# Patient Record
(demographics unavailable — no encounter records)

---

## 2024-11-07 NOTE — ASSESSMENT
[FreeTextEntry1] : Assessment: Ingrown toenail, medial and lateral aspect, left and right hallux and medial aspect, bilateral second toe.   Plan: I performed straightback procedures utilizing a 12.7 cm sterile box lock, double spring fine cut back D tip fine tip stainless steel nail splitter removing the medial and lateral borders of both the left and right hallux nails as far back as tolerable and applied Amerigel wound gel with sterile compression dressings. The patient was instructed to start soaking the feet in lukewarm water and Epsom salts, 2 tablespoons per pint for 20 minutes twice per day.  The patient was advised to dry the feet with a clean towel and then apply a sterile dressing to the area for the next 5 days.  I advised the patient to notify the office right away if increased redness, swelling, pain, open wounds or discharge were observed.  PTR:  4-6 weeks or as needed.

## 2024-11-07 NOTE — PHYSICAL EXAM
[TextEntry] : Dorsalis pedis and posterior tibial pulses were palpable and equal bilat.  The capillary return was instant bilat.  The medial and lateral borders of both hallux nails are incurvated and there is local erythema present.  Swelling is present in the nail folds.  There is a similar appearance to the medial aspects of the second toes on both feet.

## 2024-11-07 NOTE — HISTORY OF PRESENT ILLNESS
[FreeTextEntry1] : Miranda Velasco is an 11-year-old female patient who presents today with her mother for initial visit.  The mother relates that about two weeks ago they both went for pedicures and the  was rough on both of them.  Mother states that since then Miranda has been complaining about her toes.  They went to a pediatrician who sent her here.  Miranda states that first and second toes have been really uncomfortable and the mother noticed them getting red, so they did keep the appointment here.

## 2024-12-12 NOTE — ASSESSMENT
[FreeTextEntry1] : Assessment: Ingrown toenail, left hallux, right second toe.  Plan:   I performed straightback procedures utilizing a 12.7 cm sterile box lock, double spring fine cut back D tip fine tip stainless steel nail splitter removing the medial and lateral borders of both the left and right hallux nails as far back as tolerable and applied Amerigel wound gel with sterile compression dressings. The patient was instructed to start soaking the feet in lukewarm water and Epsom salts, 2 tablespoons per pint for 20 minutes twice per day.  The patient was advised to dry the feet with a clean towel and then apply a sterile dressing to the area for the next 5 days.  I advised the patient to notify the office right away if increased redness, swelling, pain, open wounds or discharge were observed.  PTR:  4-6 weeks or as needed.

## 2024-12-12 NOTE — HISTORY OF PRESENT ILLNESS
[FreeTextEntry1] : She returns with her father who had questions about why she keeps getting ingrown nails.  Miranda states that they were feeling great after she was here last time, but then over the last couple of days, they have started to dig in again.

## 2024-12-12 NOTE — PHYSICAL EXAM
[TextEntry] : The lateral aspect of the left hallux nail and the medial aspect of the right second toenail are erythematous with pain upon palpation and swelling present.  No pus or acute discharge is noted.

## 2025-01-02 NOTE — PHYSICAL EXAM
[TextEntry] : The lateral aspect of the left hallux nail is swollen and erythematous.  There is dried blood in the lateral nail fold. Pain is present upon palpation of the nail plate.  No malodor is noted.

## 2025-01-02 NOTE — HISTORY OF PRESENT ILLNESS
[FreeTextEntry1] : Miranda returns with her grandmother who stated that Miranda has been complaining of her left great toe.  Her parents were concerned that it was getting worse.

## 2025-01-02 NOTE — ASSESSMENT
[FreeTextEntry1] : Assessment: Ingrown toenail infection, left hallux lateral aspect.  Plan:   We waited for Miranda's father to arrive prior to administering anesthesia and starting the procedure.  After discussing a treatment plan and getting approval from the patient, I performed a ring block on the affected toe(s) with 3 cc of 1% Lidocaine Plain.  The left foot was prepped in the usual and customary manner for foot surgery.  Once anesthesia was achieved I removed the offending nail border, I curretaged the remaining areas for any additional debris, which was further removed.  I then applied Amerigel with a sterile compression dressing. The patient was dispensed an Amerigel Wound Kit.  The patient was instructed is to cleanse the area(s) twice a day with the wound cleanse solution, followed by application the Amerigel wound gel and bulky sterile dressing.  This is to be performed twice a day and otherwise patient is to keep the area dry for the next 5-7 days. I advised the patient to notify the office right away if increased redness, swelling, pain, open wounds or discharge were observed.  PTR:  10 days.

## 2025-04-28 NOTE — ASSESSMENT
[FreeTextEntry1] : Assessment: Chronic painful ingrown toenails, bilateral hallux.  Plan: I performed straightback procedures utilizing a 12.7 cm sterile box lock, double spring fine cut back D tip fine tip stainless steel nail splitter removing the medial and lateral borders of both the left and right hallux nails as far back as tolerable and applied Amerigel wound gel with sterile compression dressings. The patient was instructed to start soaking the feet in lukewarm water and Epsom salts, 2 tablespoons per pint for 20 minutes twice per day.  The patient was advised to dry the feet with a clean towel and then apply a sterile dressing to the area for the next 5 days.  I discussed laser surgical permanent correction of the chronic ingrown toenail(s) with the patient, including the procedures and local anesthesia involved.  I discussed the post-op care and convalescence in detail including daily dressing changes.  I explained that the patient would be allowed limited weight bearing.  I explained the risks and possible complications with the patient including infection, increased pain and swelling, slow healing and recurrence along with various alternative conservative methods.  The patient asked questions that were satisfactorily answered.  I advised the patient to notify the office right away if increased redness, swelling, pain, open wounds or discharge were observed.  I encouraged father to call me with any questions or problems, but I do feel that permanent procedures need to be done because the problem keeps recurring.  They will follow up in the summer for laser nail.

## 2025-04-28 NOTE — PHYSICAL EXAM
[TextEntry] : Both the medial and lateral borders of the left hallux nail are erythematous and painful to palpation.  The lateral border of the right hallux nail is the same.  All three areas are painful to palpation.  No pus, bleeding, or discharge is noted.

## 2025-04-28 NOTE — HISTORY OF PRESENT ILLNESS
[FreeTextEntry1] : She returns with her father who states that she continues to have problems with ingrown nails.  Miranda has been complaining that both toes hurt.  Her father was not sure exactly what he should be doing.  They were afraid to cut the nails, so they brought her here.